# Patient Record
Sex: FEMALE | Race: WHITE | URBAN - METROPOLITAN AREA
[De-identification: names, ages, dates, MRNs, and addresses within clinical notes are randomized per-mention and may not be internally consistent; named-entity substitution may affect disease eponyms.]

---

## 2022-05-12 ENCOUNTER — OFFICE VISIT (OUTPATIENT)
Dept: NEUROLOGY | Age: 37
End: 2022-05-12
Payer: COMMERCIAL

## 2022-05-12 VITALS
OXYGEN SATURATION: 99 % | BODY MASS INDEX: 21.51 KG/M2 | DIASTOLIC BLOOD PRESSURE: 60 MMHG | HEART RATE: 70 BPM | HEIGHT: 64 IN | SYSTOLIC BLOOD PRESSURE: 110 MMHG | WEIGHT: 126 LBS

## 2022-05-12 DIAGNOSIS — G43.709 CHRONIC MIGRAINE WITHOUT AURA WITHOUT STATUS MIGRAINOSUS, NOT INTRACTABLE: Primary | ICD-10-CM

## 2022-05-12 DIAGNOSIS — G43.109 CHRONIC MIGRAINE WITH AURA: ICD-10-CM

## 2022-05-12 DIAGNOSIS — G43.109 COMPLICATED MIGRAINE: ICD-10-CM

## 2022-05-12 PROCEDURE — 99204 OFFICE O/P NEW MOD 45 MIN: CPT | Performed by: PSYCHIATRY & NEUROLOGY

## 2022-05-12 RX ORDER — CETIRIZINE HYDROCHLORIDE 10 MG/1
CAPSULE, LIQUID FILLED ORAL
COMMUNITY

## 2022-05-12 RX ORDER — MELATONIN
1000 DAILY
COMMUNITY

## 2022-05-12 RX ORDER — BUTALBITAL, ACETAMINOPHEN AND CAFFEINE 50; 325; 40 MG/1; MG/1; MG/1
TABLET ORAL
Qty: 24 TABLET | Refills: 0 | Status: SHIPPED | OUTPATIENT
Start: 2022-05-12

## 2022-05-12 RX ORDER — FROVATRIPTAN SUCCINATE 2.5 MG/1
TABLET, FILM COATED ORAL
Qty: 6 TABLET | Refills: 0 | Status: SHIPPED | OUTPATIENT
Start: 2022-05-12

## 2022-05-12 RX ORDER — FREMANEZUMAB-VFRM 225 MG/1.5ML
INJECTION SUBCUTANEOUS
COMMUNITY
Start: 2022-05-04 | End: 2022-05-12 | Stop reason: SDUPTHER

## 2022-05-12 RX ORDER — ONDANSETRON 4 MG/1
TABLET, ORALLY DISINTEGRATING ORAL
Qty: 20 TABLET | Refills: 0 | Status: SHIPPED | OUTPATIENT
Start: 2022-05-12

## 2022-05-12 RX ORDER — FREMANEZUMAB-VFRM 225 MG/1.5ML
225 INJECTION SUBCUTANEOUS
Qty: 1 EACH | Refills: 5 | Status: SHIPPED | OUTPATIENT
Start: 2022-05-12 | End: 2022-10-25

## 2022-05-12 NOTE — PROGRESS NOTES
Cruzito Sen (1985) is a 39 y.o. female, new patient, here for evaluation of the following     Chief complaint(s):   Chief Complaint   Patient presents with   [address] Patient     2-3 a month with ajovy lasting one day    Migraine       HPI: 39 y.o. female PMHx chronic migraine with and without visual aura, hx of PFO closure (\"noble stitch\" procedure), hx of complicated migraine (10 life-time episodes)    Referred to establish care regarding chronic migraine. 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 Previously followed by a Dr Lovell Congress at [facility]. Patient describes she started having migraines during college. When she gets a migraine it is a viselike feeling across her head, with light and sound sensitivity, with some degree of nausea and occasional vomiting. She reports her migraine generally last 2 days when she gets them. She reports her migraine frequency and responsiveness to acute migraine pain reliever was improved after addition of Ajovy (225 mg SQ once monthly injection). She estimates that now she has maybe 1 day a week of headache and that day is a migraine. For acute headache pain relief, she uses Tylenol 1000 mg and Ibuprofen 800 mg. She reports having episodic visual aura with her migraines, and separate from that, having had around life-time episodes of right arm numbness/ change in right vision with a migraine headache (ie complicated migraine). Says this was extensively evaluated with her previous Neurologist and Brain MRI imaging didn't reveal any concerning abnormalities (reviewed Brain MRI report 5-13-15 through [place]: Brain architecture is normal. 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 ..)    She reports trying and failing many different migraine preventive meds: topamax (paresthesia, weight loss, cognitive difficulty), inderal (x 10 yrs, caused low BP, and fatigue), amitriptyline (dry mouth). She reports trying/ failing many different acute migraine pain relievers: Ubrelvy (didn't help), Maxalt, Imitrex tablet, Imitrex Nasal Spray, Zomig Nasal Spray, Treximet (Naproxen-Sumatriptan), Relpax, Amerge     Does not recall trying: Nurtec, Reyvow, Frova, Fioricet          Review of Systems: completely negative.      ==================================================    Allergies   Allergen Reactions    Sulfa (Sulfonamide Antibiotics) Hives       Current Outpatient Medications   Medication Sig Dispense Refill    cholecalciferol (VITAMIN D3) (1000 Units /25 mcg) tablet Take 1,000 Units by mouth daily.  Cetirizine (ZyrTEC) 10 mg cap Zyrtec      frovatriptan (Frova) 2.5 mg tablet 1 tab at onset of migraine. May repeat one tablet in 2 hours if headache remains. Limit use to 2 days a week. 6 Tablet 0    butalbital-acetaminophen-caffeine (FIORICET, ESGIC) -40 mg per tablet Take 1 to 2 tabs at onset of migraine. May repeat one tab in 4 hours if headache remains. Limit use to 2 days a week. 24 Tablet 0    Ajovy Syringe 225 mg/1.5 mL syrg injection 1.5 mL by SubCUTAneous route every thirty (30) days. 1 Each 5    ondansetron (ZOFRAN ODT) 4 mg disintegrating tablet Take one tablet at onset of migraine-related nausea. May repeat one tablet in 4 hours if nausea remains. Caution: may cause drowsiness. Limit use to 2 days a week. 20 Tablet 0       No past medical history on file. No past surgical history on file. family history is not on file. PHYSICAL EXAM    Vitals:    05/12/22 1248   BP: 110/60   Pulse: 70   Height: 5' 4\" (1.626 m)   Weight: 57.2 kg (126 lb)   SpO2: 99%       General:    Head: atraumatic. Eyes: Conjunctivae clear. Vascular/ Carotid Arteries: not examined. Extremities: no edema. Skin: no rashes. Neurologic Exam:  Speech/ Language: no aphasia, no dysarthrai. Alertness: oriented x 3.  CNs: Smell: not tested.   Visual Fields (II): full to confrontation. Pupils (II): not examined. Funduscopic: not examined. Extraocular movements (III, IV, VI): conjugate in all directions, no LEVI. Ptosis (III, VII): none. Facial Sensation (V): intact to LT on both sides. Facial Movements (VII): symmetric at rest and on activation. Hearing (VIII): normal.  Soft palate elevation (IX): symmetric, no droop. Shoulder shrug (XI): not examined. Tongue protrusion (XII): midline    Motor:  5/5 in all exts. Sensory: intact LT in all exts. Cerebellar: no resting, postural or intention tremors on either side . Deep Tendon Reflexes: 1+ biceps, 2+ patellars. Plantar response: not tested. Gait: normal, including tandem. Romberg: negative      ==================================================    ASSESSMENT/ PLAN:       ICD-10-CM ICD-9-CM    1. Chronic migraine without aura without status migrainosus, not intractable  G43.709 346.70    2. Chronic migraine with aura  G43.109 346.00 frovatriptan (Frova) 2.5 mg tablet      butalbital-acetaminophen-caffeine (FIORICET, ESGIC) -40 mg per tablet      Ajovy Syringe 225 mg/1.5 mL syrg injection      ondansetron (ZOFRAN ODT) 4 mg disintegrating tablet   3. Complicated migraine  C71.978 346.00       Multiple migraine types:     Chronic migraine without aura  Chronic migraine with aura  Hx of Complicated migraine      Pt reports noticeable reduction in migraine frequency and increased responsiveness to acute headache pain relieves since being started on Ajovy by prior Neurologist.  Continue Ajovy 225 mg SQ once every 30 days as a migraine preventive. New Rx sent. For acute migraine pain reliever, pt has tried/ failed multiple meds as noted above and has been relying on Tylenol/ Ibuprofen to alleviate migraines. Discussed other meds: sent trial Rxs of Frovatriptan and Fioricet, pt can try on different migraine days and see if either works better than her Ibuprofen/ Tylenol.       Sent Rx for Zofran 4 mg ODT tablet to use of migraine related nausea    Follow up in 6 months      An electronic signature was used to authenticate this note.   -- Roddy Sanchez MD

## 2022-05-24 ENCOUNTER — TELEPHONE (OUTPATIENT)
Dept: NEUROLOGY | Age: 37
End: 2022-05-24

## 2022-05-24 DIAGNOSIS — G43.709 CHRONIC MIGRAINE WITHOUT AURA WITHOUT STATUS MIGRAINOSUS, NOT INTRACTABLE: Primary | ICD-10-CM

## 2022-06-20 ENCOUNTER — TELEPHONE (OUTPATIENT)
Dept: NEUROLOGY | Age: 37
End: 2022-06-20

## 2022-06-20 NOTE — TELEPHONE ENCOUNTER
Nurte submitted to [employer] via [facility] -     Case SD:[id number];KEVPQA:[id number]; Review Type:Prior Auth; Coverage Start Date:05/21/2022 - 06/20/2023    escribed to [facility] on [address]:   E-Prescribing Status: Receipt confirmed by pharmacy (5/25/2022  1:22 PM EDT)

## 2022-11-22 ENCOUNTER — OFFICE VISIT (OUTPATIENT)
Dept: NEUROLOGY | Age: 37
End: 2022-11-22
Payer: COMMERCIAL

## 2022-11-22 VITALS
DIASTOLIC BLOOD PRESSURE: 70 MMHG | BODY MASS INDEX: 22.02 KG/M2 | WEIGHT: 129 LBS | HEART RATE: 82 BPM | OXYGEN SATURATION: 98 % | HEIGHT: 64 IN | SYSTOLIC BLOOD PRESSURE: 108 MMHG

## 2022-11-22 DIAGNOSIS — G43.109 CHRONIC MIGRAINE WITH AURA: Primary | ICD-10-CM

## 2022-11-22 PROCEDURE — 99214 OFFICE O/P EST MOD 30 MIN: CPT

## 2022-11-22 RX ORDER — DICLOFENAC POTASSIUM 50 MG/1
POWDER, FOR SOLUTION ORAL
Qty: 9 PACKET | Refills: 2 | Status: SHIPPED | OUTPATIENT
Start: 2022-11-22

## 2022-11-22 NOTE — PROGRESS NOTES
Fatmata Sahni is a 40 y.o. female who presents with the following  Chief Complaint   Patient presents with    Follow-up     6 month    Migraine       HPI  Patient here for migraine follow-up. Last seen by Dr. Charmaine Wright 5/12/22. Patient was taking Ajovy 225 mg monthly injection that reduced her migraine to  1 a week. She was also using Tylenol 1000 mg and ibuprofen 800 mg for acute headaches. Her migraines started in college and they occured with a viselike feeling across her head + visual aura + photophobia and phonophobia, + n/v. She has a history of complicated migraines with right arm numbness/change in right vision. Patient states that she has not had any complicated migraines  since her PFO closure in 2018. She had an MRI of the brain with her previous neurologist that did not reveal any concerning abnormalities. She reports trying and failing many different migraine preventive meds: topamax (paresthesia, weight loss, cognitive difficulty), inderal (x 10 yrs, caused low BP, and fatigue), amitriptyline (dry mouth). She reports trying/ failing many different acute migraine pain relievers: Ubrelvy (didn't help), Maxalt, Imitrex tablet, Imitrex Nasal Spray, Zomig Nasal Spray, Treximet (Naproxen-Sumatriptan), Relpax, Amerge   Did not recall trying: Ryan Antis, Frova, Fioricet    Dr. Charmaine Wright ordered trial prescriptions of the Frovatriptan and Fioricet to try on different migraine days to see if either works better than her ibuprofen/Tylenol. He sent a prescription for Zofran 4 mg ODT tablet to use for migraine related nausea. Patient called the office May 24, 2022 asking for a prescription for Nurtec for migraine rescue. This was sent in her prior authorization was approved until June 20, 2023. Today the patient states that she is having 4 or 5 total migraine days a month. She is able to function with these migraines.   The migraines feel the same as if she has a vice  on her head from front to back. She experiences some nausea but has not had to take Zofran in quite a while. She is using the Ajovy 225 mg monthly which she likes and feels works well for her and Nurtec ODT 75 mg for rescue. She states that sometimes the Nurtec does not help to abort the headache and still takes Tylenol and ibuprofen if needed. She decided not to trial the frovatriptan or Fioricet that was prescribed to her at the last visit. She states that caffeine is one of her triggers therefore she does not want to try Fioricet. She states that triptans always made her have nausea vomiting and does not want frovatriptan. Patient states that she is not sure if she is ever tried Cambia powder for rescue before, but is willing to try it now. Current Outpatient Medications   Medication Sig    Diclofenac Potassium (Cambia) 50 mg pwpk Mix 1 packet with 1-2 ounces of water and drink immediately. One packet per migraine. Do not use more than 10 times per month. Do not use with other NSAIDS. Indications: a migraine headache    Ajovy Syringe 225 mg/1.5 mL syrg injection INJECT 225 MG (1.5ML) INTO THE SKIN EVERY 30 (THIRTY) DAYS    rimegepant (NURTEC) 75 mg disintegrating tablet Take one tablet at onset of migraine. Limit use to one tablet per 24 hours. Limit use to 2 days a week. cholecalciferol (VITAMIN D3) (1000 Units /25 mcg) tablet Take 1,000 Units by mouth daily. Cetirizine (ZyrTEC) 10 mg cap Zyrtec    ondansetron (ZOFRAN ODT) 4 mg disintegrating tablet Take one tablet at onset of migraine-related nausea. May repeat one tablet in 4 hours if nausea remains. Caution: may cause drowsiness. Limit use to 2 days a week. No current facility-administered medications for this visit. Social History     Tobacco Use   Smoking Status Never   Smokeless Tobacco Never       Past Medical History:   Diagnosis Date    Migraine        No past surgical history on file. History reviewed. No pertinent family history.     Social History     Socioeconomic History    Marital status: UNKNOWN   Tobacco Use    Smoking status: Never    Smokeless tobacco: Never   Vaping Use    Vaping Use: Never used   Substance and Sexual Activity    Drug use: Never       Review of Systems   Constitutional: Negative. Neurological: Negative. Remainder of comprehensive review is negative. Physical Exam :    Visit Vitals  /70 (BP 1 Location: Right upper arm, BP Patient Position: Sitting, BP Cuff Size: Adult)   Pulse 82   Ht 5' 4\" (1.626 m)   Wt 58.5 kg (129 lb)   SpO2 98%   BMI 22.14 kg/m²       General: Well defined, nourished, and groomed individual in no acute distress. Neck: Supple, nontender, no bruits, no pain with resistance to active range of motion. Musculoskeletal: Extremities revealed no edema and had full range of motion of joints. Psych: Good mood and bright affect    NEUROLOGICAL EXAMINATION:    Mental Status: Alert and oriented to person, place, and time    Cranial Nerves:    II, III, IV, VI: Visual acuity grossly intact. Visual fields are normal.    Pupils are equal, round, and reactive to light and accommodation. Extra-ocular movements are full and fluid. Fundoscopic exam was benign, no ptosis or nystagmus. V-XII: Hearing is grossly intact. Facial features are symmetric, with normal sensation and strength. The palate rises symmetrically and the tongue protrudes midline. Sternocleidomastoids 5/5. Motor Examination: Normal tone, bulk, and strength, 5/5 muscle strength throughout. Coordination: Finger to nose was normal. No resting or intention tremor    Gait and Station: Steady while walking. Normal arm swing. No pronator drift. No muscle wasting or fasiculations noted. Reflexes: DTRs 2+ throughout. No results found for this or any previous visit. Orders Placed This Encounter    Diclofenac Potassium (Cambia) 50 mg pwpk     Sig: Mix 1 packet with 1-2 ounces of water and drink immediately.  One packet per migraine. Do not use more than 10 times per month. Do not use with other NSAIDS. Indications: a migraine headache     Dispense:  9 Packet     Refill:  2       1. Chronic migraine with aura      Follow-up and Dispositions    Return in about 9 weeks (around 1/24/2023) for Medication f/u started Cambia . Continue using Ajovy 225 mg for migraine prevention monthly  Continue using Nurtec ODT 75 mg for migraine rescue as needed once a day  Sent prescription for Cambia 50 mg powder to be taken for migraine rescue if Nurtec does not stop the migraine first.  Advised patient not to take with other NSAIDs or  more than 10 days a month. Return to the office in 8 to 9 weeks for medication evaluation or sooner if needed.       This note will not be viewable in [employer]

## 2022-11-22 NOTE — PROGRESS NOTES
Identified pt with two pt identifiers. Reviewed record in preparation for visit and have obtained necessary documentation. All patient medications has been reviewed. Chief Complaint   Patient presents with    Follow-up     6 month    Migraine     Additional information about chief complaint:    Visit Vitals  /70 (BP 1 Location: Right upper arm, BP Patient Position: Sitting, BP Cuff Size: Adult)   Pulse 82   Ht 5' 4\" (1.626 m)   Wt 129 lb (58.5 kg)   SpO2 98%   BMI 22.14 kg/m²       Health Maintenance Due   Topic    Hepatitis C Screening     Cervical cancer screen        1. Have you been to the ER, urgent care clinic since your last visit? Hospitalized since your last visit? no    2. Have you seen or consulted any other health care providers outside of the [address] [place] [address] since your last visit? Include any pap smears or colon screening.  no

## 2022-12-19 ENCOUNTER — APPOINTMENT (RX ONLY)
Dept: URBAN - METROPOLITAN AREA CLINIC 41 | Facility: CLINIC | Age: 37
Setting detail: DERMATOLOGY
End: 2022-12-19

## 2022-12-19 DIAGNOSIS — L70.0 ACNE VULGARIS: ICD-10-CM | Status: INADEQUATELY CONTROLLED

## 2022-12-19 PROCEDURE — 99204 OFFICE O/P NEW MOD 45 MIN: CPT

## 2022-12-19 PROCEDURE — ? COUNSELING

## 2022-12-19 PROCEDURE — ? ORDER TESTS

## 2022-12-19 PROCEDURE — ? PRESCRIPTION

## 2022-12-19 PROCEDURE — ? PRESCRIPTION MEDICATION MANAGEMENT

## 2022-12-19 PROCEDURE — ? ADDITIONAL NOTES

## 2022-12-19 RX ORDER — CLASCOTERONE 1 G/100G
CREAM TOPICAL
Qty: 60 | Refills: 4 | Status: ERX | COMMUNITY
Start: 2022-12-19

## 2022-12-19 RX ADMIN — CLASCOTERONE: 1 CREAM TOPICAL at 00:00

## 2022-12-19 NOTE — PROCEDURE: PRESCRIPTION MEDICATION MANAGEMENT
Render In Strict Bullet Format?: No
Detail Level: Zone
Initiate Treatment: [place] 50mg bid x 6 months pending kaleigh MCKEE bid

## 2022-12-19 NOTE — PROCEDURE: COUNSELING
Isotretinoin Counseling: Patient should get monthly blood tests, not donate blood, not drive at night if vision affected, not share medication, and not undergo elective surgery for 6 months after tx completed. Side effects reviewed, pt to contact office should one occur.
Topical Sulfur Applications Pregnancy And Lactation Text: This medication is Pregnancy Category C and has an unknown safety profile during pregnancy. It is unknown if this topical medication is excreted in breast milk.
Birth Control Pills Pregnancy And Lactation Text: This medication should be avoided if pregnant and for the first 30 days post-partum.
Sarecycline Pregnancy And Lactation Text: This medication is Pregnancy Category D and not consider safe during pregnancy. It is also excreted in breast milk.
Azelaic Acid Counseling: Patient counseled that medicine may cause skin irritation and to avoid applying near the eyes.  In the event of skin irritation, the patient was advised to reduce the amount of the drug applied or use it less frequently.   The patient verbalized understanding of the proper use and possible adverse effects of azelaic acid.  All of the patient's questions and concerns were addressed.
Detail Level: Zone
Bactrim Pregnancy And Lactation Text: This medication is Pregnancy Category D and is known to cause fetal risk.  It is also excreted in breast milk.
Tazorac Pregnancy And Lactation Text: This medication is not safe during pregnancy. It is unknown if this medication is excreted in breast milk.
Patient Specific Counseling (Will Not Stick From Patient To Patient): NG counsels on Accutane as possible option since pt has tried and failed so many medications. Pt will retry jefe 50mg bid and is sending over recent BW results with cmp from pcp. Pt to consider accutane if fails jefe.
Include Pregnancy/Lactation Warning?: No
Erythromycin Counseling:  I discussed with the patient the risks of erythromycin including but not limited to GI upset, allergic reaction, drug rash, diarrhea, increase in liver enzymes, and yeast infections.
Aklief counseling:  Patient advised to apply a pea-sized amount only at bedtime and wait 30 minutes after washing their face before applying.  If too drying, patient may add a non-comedogenic moisturizer.  The most commonly reported side effects including irritation, redness, scaling, dryness, stinging, burning, itching, and increased risk of sunburn.  The patient verbalized understanding of the proper use and possible adverse effects of retinoids.  All of the patient's questions and concerns were addressed.
Topical Retinoid Pregnancy And Lactation Text: This medication is Pregnancy Category C. It is unknown if this medication is excreted in breast milk.
Winlevi Pregnancy And Lactation Text: This medication is considered safe during pregnancy and breastfeeding.
High Dose Vitamin A Pregnancy And Lactation Text: High dose vitamin A therapy is contraindicated during pregnancy and breast feeding.
Azithromycin Pregnancy And Lactation Text: This medication is considered safe during pregnancy and is also secreted in breast milk.
Doxycycline Counseling:  Patient counseled regarding possible photosensitivity and increased risk for sunburn.  Patient instructed to avoid sunlight, if possible.  When exposed to sunlight, patients should wear protective clothing, sunglasses, and sunscreen.  The patient was instructed to call the office immediately if the following severe adverse effects occur:  hearing changes, easy bruising/bleeding, severe headache, or vision changes.  The patient verbalized understanding of the proper use and possible adverse effects of doxycycline.  All of the patient's questions and concerns were addressed.
Benzoyl Peroxide Pregnancy And Lactation Text: This medication is Pregnancy Category C. It is unknown if benzoyl peroxide is excreted in breast milk.
Tetracycline Counseling: Patient counseled regarding possible photosensitivity and increased risk for sunburn.  Patient instructed to avoid sunlight, if possible.  When exposed to sunlight, patients should wear protective clothing, sunglasses, and sunscreen.  The patient was instructed to call the office immediately if the following severe adverse effects occur:  hearing changes, easy bruising/bleeding, severe headache, or vision changes.  The patient verbalized understanding of the proper use and possible adverse effects of tetracycline.  All of the patient's questions and concerns were addressed. Patient understands to avoid pregnancy while on therapy due to potential birth defects.
Spironolactone Counseling: Patient advised regarding risks of diarrhea, abdominal pain, hyperkalemia, birth defects (for female patients), liver toxicity and renal toxicity. The patient may need blood work to monitor liver and kidney function and potassium levels while on therapy. The patient verbalized understanding of the proper use and possible adverse effects of spironolactone.  All of the patient's questions and concerns were addressed.
Dapsone Counseling: I discussed with the patient the risks of dapsone including but not limited to hemolytic anemia, agranulocytosis, rashes, methemoglobinemia, kidney failure, peripheral neuropathy, headaches, GI upset, and liver toxicity.  Patients who start dapsone require monitoring including baseline LFTs and weekly CBCs for the first month, then every month thereafter.  The patient verbalized understanding of the proper use and possible adverse effects of dapsone.  All of the patient's questions and concerns were addressed.
Isotretinoin Pregnancy And Lactation Text: This medication is Pregnancy Category X and is considered extremely dangerous during pregnancy. It is unknown if it is excreted in breast milk.
Topical Sulfur Applications Counseling: Topical Sulfur Counseling: Patient counseled that this medication may cause skin irritation or allergic reactions.  In the event of skin irritation, the patient was advised to reduce the amount of the drug applied or use it less frequently.   The patient verbalized understanding of the proper use and possible adverse effects of topical sulfur application.  All of the patient's questions and concerns were addressed.
Azelaic Acid Pregnancy And Lactation Text: This medication is considered safe during pregnancy and breast feeding.
Birth Control Pills Counseling: Birth Control Pill Counseling: I discussed with the patient the potential side effects of OCPs including but not limited to increased risk of stroke, heart attack, thrombophlebitis, deep venous thrombosis, hepatic adenomas, breast changes, GI upset, headaches, and depression.  The patient verbalized understanding of the proper use and possible adverse effects of OCPs. All of the patient's questions and concerns were addressed.
Topical Clindamycin Counseling: Patient counseled that this medication may cause skin irritation or allergic reactions.  In the event of skin irritation, the patient was advised to reduce the amount of the drug applied or use it less frequently.   The patient verbalized understanding of the proper use and possible adverse effects of clindamycin.  All of the patient's questions and concerns were addressed.
Sarecycline Counseling: Patient advised regarding possible photosensitivity and discoloration of the teeth, skin, lips, tongue and gums.  Patient instructed to avoid sunlight, if possible.  When exposed to sunlight, patients should wear protective clothing, sunglasses, and sunscreen.  The patient was instructed to call the office immediately if the following severe adverse effects occur:  hearing changes, easy bruising/bleeding, severe headache, or vision changes.  The patient verbalized understanding of the proper use and possible adverse effects of sarecycline.  All of the patient's questions and concerns were addressed.
Aklief Pregnancy And Lactation Text: It is unknown if this medication is safe to use during pregnancy.  It is unknown if this medication is excreted in breast milk.  Breastfeeding women should use the topical cream on the smallest area of the skin for the shortest time needed while breastfeeding.  Do not apply to nipple and areola.
Erythromycin Pregnancy And Lactation Text: This medication is Pregnancy Category B and is considered safe during pregnancy. It is also excreted in breast milk.
Winlevi Counseling:  I discussed with the patient the risks of topical clascoterone including but not limited to erythema, scaling, itching, and stinging. Patient voiced their understanding.
Doxycycline Pregnancy And Lactation Text: This medication is Pregnancy Category D and not consider safe during pregnancy. It is also excreted in breast milk but is considered safe for shorter treatment courses.
Tazorac Counseling:  Patient advised that medication is irritating and drying.  Patient may need to apply sparingly and wash off after an hour before eventually leaving it on overnight.  The patient verbalized understanding of the proper use and possible adverse effects of tazorac.  All of the patient's questions and concerns were addressed.
Bactrim Counseling:  I discussed with the patient the risks of sulfa antibiotics including but not limited to GI upset, allergic reaction, drug rash, diarrhea, dizziness, photosensitivity, and yeast infections.  Rarely, more serious reactions can occur including but not limited to aplastic anemia, agranulocytosis, methemoglobinemia, blood dyscrasias, liver or kidney failure, lung infiltrates or desquamative/blistering drug rashes.
Minocycline Counseling: Patient advised regarding possible photosensitivity and discoloration of the teeth, skin, lips, tongue and gums.  Patient instructed to avoid sunlight, if possible.  When exposed to sunlight, patients should wear protective clothing, sunglasses, and sunscreen.  The patient was instructed to call the office immediately if the following severe adverse effects occur:  hearing changes, easy bruising/bleeding, severe headache, or vision changes.  The patient verbalized understanding of the proper use and possible adverse effects of minocycline.  All of the patient's questions and concerns were addressed.
Topical Retinoid counseling:  Patient advised to apply a pea-sized amount only at bedtime and wait 30 minutes after washing their face before applying.  If too drying, patient may add a non-comedogenic moisturizer. The patient verbalized understanding of the proper use and possible adverse effects of retinoids.  All of the patient's questions and concerns were addressed.
High Dose Vitamin A Counseling: Side effects reviewed, pt to contact office should one occur.
Dapsone Pregnancy And Lactation Text: This medication is Pregnancy Category C and is not considered safe during pregnancy or breast feeding.
Azithromycin Counseling:  I discussed with the patient the risks of azithromycin including but not limited to GI upset, allergic reaction, drug rash, diarrhea, and yeast infections.
Topical Clindamycin Pregnancy And Lactation Text: This medication is Pregnancy Category B and is considered safe during pregnancy. It is unknown if it is excreted in breast milk.
Benzoyl Peroxide Counseling: Patient counseled that medicine may cause skin irritation and bleach clothing.  In the event of skin irritation, the patient was advised to reduce the amount of the drug applied or use it less frequently.   The patient verbalized understanding of the proper use and possible adverse effects of benzoyl peroxide.  All of the patient's questions and concerns were addressed.
Spironolactone Pregnancy And Lactation Text: This medication can cause feminization of the male fetus and should be avoided during pregnancy. The active metabolite is also found in breast milk.

## 2022-12-19 NOTE — HPI: PIMPLES (ACNE)
Is This A New Presentation, Or A Follow-Up?: Acne
Additional Comments (Use Complete Sentences): This is an established patient here for acne. She has treated before but topicals were way too drying. She is using over the counter Adapalene but not having much success.\\n\\nShe uses otc hydrocholloid acne patches which help treat but not prevent the big cystic pimples around chin and neck area.\\n\\nShe has tried and failed aczone, doxycycline, amzeeq, veltin, epiduo forte, soolantra, spironolactone, finacea.

## 2022-12-19 NOTE — PROCEDURE: ORDER TESTS
Expected Date Of Service: 12/19/2022
Performing Laboratory: 0
Billing Type: Third-Party Bill
Bill For Surgical Tray: no

## 2022-12-23 ENCOUNTER — RX ONLY (OUTPATIENT)
Age: 37
Setting detail: RX ONLY
End: 2022-12-23

## 2022-12-23 RX ORDER — CLASCOTERONE 1 G/100G
CREAM TOPICAL
Qty: 60 | Refills: 4 | Status: ERX

## 2022-12-23 RX ORDER — SPIRONOLACTONE 50 MG/1
TABLET, FILM COATED ORAL
Qty: 120 | Refills: 2 | Status: ERX | COMMUNITY
Start: 2022-12-23

## 2022-12-27 DIAGNOSIS — G43.109 CHRONIC MIGRAINE WITH AURA: Primary | ICD-10-CM

## 2022-12-28 NOTE — TELEPHONE ENCOUNTER
Patient requesting refill on:    Requested Prescriptions     Pending Prescriptions Disp Refills    Ajovy Syringe 225 mg/1.5 mL syrg injection [Pharmacy Med Name: AJOVY 225 MG/1.5 ML SYRINGE]  1     Sig: INJECT 225 MG (1.5ML) INTO THE SKIN EVERY 30 (THIRTY) DAYS

## 2022-12-29 ENCOUNTER — TELEPHONE (OUTPATIENT)
Dept: NEUROLOGY | Age: 37
End: 2022-12-29

## 2022-12-29 DIAGNOSIS — G43.109 CHRONIC MIGRAINE WITH AURA: ICD-10-CM

## 2022-12-29 RX ORDER — FREMANEZUMAB-VFRM 225 MG/1.5ML
INJECTION SUBCUTANEOUS
Qty: 1 EACH | Refills: 1 | Status: SHIPPED | OUTPATIENT
Start: 2022-12-29

## 2022-12-29 NOTE — TELEPHONE ENCOUNTER
Requested Prescriptions     Pending Prescriptions Disp Refills    fremanezumab-vfrm (Ajovy Syringe) 225 mg/1.5 mL syrg injection 1 Each 1     Pt req refill

## 2023-01-03 RX ORDER — FREMANEZUMAB-VFRM 225 MG/1.5ML
225 INJECTION SUBCUTANEOUS
Qty: 1.5 ML | Refills: 2 | Status: SHIPPED | OUTPATIENT
Start: 2023-01-03

## 2023-01-03 RX ORDER — FREMANEZUMAB-VFRM 225 MG/1.5ML
INJECTION SUBCUTANEOUS
Refills: 1 | OUTPATIENT
Start: 2023-01-03

## 2023-01-17 ENCOUNTER — OFFICE VISIT (OUTPATIENT)
Dept: NEUROLOGY | Age: 38
End: 2023-01-17
Payer: COMMERCIAL

## 2023-01-17 VITALS
SYSTOLIC BLOOD PRESSURE: 102 MMHG | RESPIRATION RATE: 20 BRPM | DIASTOLIC BLOOD PRESSURE: 68 MMHG | OXYGEN SATURATION: 98 % | HEART RATE: 79 BPM

## 2023-01-17 DIAGNOSIS — G43.709 CHRONIC MIGRAINE WITHOUT AURA WITHOUT STATUS MIGRAINOSUS, NOT INTRACTABLE: Primary | ICD-10-CM

## 2023-01-17 PROCEDURE — 99213 OFFICE O/P EST LOW 20 MIN: CPT

## 2023-01-17 NOTE — PROGRESS NOTES
Headaches-  cambia was approved but pharmacy never notified her it was approved     Same as when she was here last visit   Usually around her cycle- this past month was about 6 headache days which is average   Some months it might be 8 headaches days a month

## 2023-01-17 NOTE — PROGRESS NOTES
Hunter Maurer is a 40 y.o. female who presents with the following  Chief Complaint   Patient presents with    Follow-up     Tried a new medication Cambia- headaches        HPI  Patient here for migraine follow-up. Last seen by Dr. Fuad Roy 5/12/22. Patient was taking Ajovy 225 mg monthly injection that reduced her migraine to  1 a week. She was also using Tylenol 1000 mg and ibuprofen 800 mg for acute headaches. Her migraines started in college and they occured with a viselike feeling across her head + visual aura + photophobia and phonophobia, + n/v. She has a history of complicated migraines with right arm numbness/change in right vision. Patient states that she has not had any complicated migraines  since her PFO closure in 2018. She had an MRI of the brain with her previous neurologist that did not reveal any concerning abnormalities. She reports trying and failing many different migraine preventive meds: topamax (paresthesia, weight loss, cognitive difficulty), inderal (x 10 yrs, caused low BP, and fatigue), amitriptyline (dry mouth). She reports trying/ failing many different acute migraine pain relievers: Ubrelvy (didn't help), Maxalt, Imitrex tablet, Imitrex Nasal Spray, Zomig Nasal Spray, Treximet (Naproxen-Sumatriptan), Relpax, Amerge   Did not recall trying: Tildon Vernon, Frova, Fioricet     Dr. Fuad Roy ordered trial prescriptions of the Frovatriptan and Fioricet to try on different migraine days to see if either works better than her ibuprofen/Tylenol. He sent a prescription for Zofran 4 mg ODT tablet to use for migraine related nausea. Patient called the office May 24, 2022 asking for a prescription for Nurtec for migraine rescue. This was sent in her prior authorization was approved until June 20, 2023. Today the patient states that she is having 4 or 5 total migraine days a month. She is able to function with these migraines.   The migraines feel the same as if she has a vice  on her head from front to back. She experiences some nausea but has not had to take Zofran in quite a while. She is using the Ajovy 225 mg monthly which she likes and feels works well for her and Nurtec ODT 75 mg for rescue. She states that sometimes the Nurtec does not help to abort the headache and still takes Tylenol and ibuprofen if needed. She decided not to trial the frovatriptan or Fioricet that was prescribed to her at the last visit. She states that caffeine is one of her triggers therefore she does not want to try Fioricet. She states that triptans always made her have nausea vomiting and does not want frovatriptan. Patient states that she is not sure if she is ever tried Cambia powder for rescue before, but is willing to try it now. Continue using Ajovy 225 mg for migraine prevention monthly  Continue using Nurtec ODT 75 mg for migraine rescue as needed once a day  Sent prescription for Cambia 50 mg powder to be taken for migraine rescue if Nurtec does not stop the migraine first.  Advised patient not to take with other NSAIDs or  more than 10 days a month. Return to the office in 8 to 9 weeks for medication evaluation or sooner if needed. Today's note:  Patient is just learning that her Georgiana Martines prior authorization was approved until January 2, 2023 so she will call her pharmacy and have this filled today. She states that the month of December was not bad as far as headaches were concerned (had maybe 4) she did take 1 Nurtec and some Tylenol and Advil which helped relieve her headaches. Still doing well with the Ajovy. States that since being on Ajovy her migraines are more manageable and less severe. Allergies   Allergen Reactions    Sulfa (Sulfonamide Antibiotics) Hives       Current Outpatient Medications   Medication Sig    fremanezumab-vfrm (Ajovy Autoinjector) 225 mg/1.5 mL auto-injector 1.5 mL by SubCUTAneous route every month.     rimegepant (NURTEC) 75 mg disintegrating tablet Take one tablet at onset of migraine. Limit use to one tablet per 24 hours. Limit use to 2 days a week. cholecalciferol (VITAMIN D3) (1000 Units /25 mcg) tablet Take 1,000 Units by mouth daily. Cetirizine (ZyrTEC) 10 mg cap Zyrtec    ondansetron (ZOFRAN ODT) 4 mg disintegrating tablet Take one tablet at onset of migraine-related nausea. May repeat one tablet in 4 hours if nausea remains. Caution: may cause drowsiness. Limit use to 2 days a week. Diclofenac Potassium (Cambia) 50 mg pwpk Mix 1 packet with 1-2 ounces of water and drink immediately. One packet per migraine. Do not use more than 10 times per month. Do not use with other NSAIDS. Indications: a migraine headache (Patient not taking: Reported on 1/17/2023)     No current facility-administered medications for this visit. Social History     Tobacco Use   Smoking Status Never   Smokeless Tobacco Never       Past Medical History:   Diagnosis Date    Migraine        No past surgical history on file. No family history on file. Social History     Socioeconomic History    Marital status: UNKNOWN   Tobacco Use    Smoking status: Never    Smokeless tobacco: Never   Vaping Use    Vaping Use: Never used   Substance and Sexual Activity    Drug use: Never       Review of Systems   Neurological:  Positive for headaches. Remainder of comprehensive review is negative. Physical Exam :    Visit Vitals  /68 (BP 1 Location: Left upper arm, BP Patient Position: Sitting, BP Cuff Size: Adult)   Pulse 79   Resp 20   SpO2 98%       General: Well defined, nourished, and groomed individual in no acute distress. Neck: Supple, nontender, no bruits, no pain with resistance to active range of motion. Musculoskeletal: Extremities revealed no edema and had full range of motion of joints.     Psych: Good mood and bright affect    NEUROLOGICAL EXAMINATION:    Mental Status: Alert and oriented to person, place, and time    Cranial Nerves: II, III, IV, VI: Visual acuity grossly intact. Visual fields are normal.    Pupils are equal, round, and reactive to light and accommodation. Extra-ocular movements are full and fluid. Fundoscopic exam was benign, no ptosis or nystagmus. V-XII: Hearing is grossly intact. Facial features are symmetric, with normal sensation and strength. The palate rises symmetrically and the tongue protrudes midline. Sternocleidomastoids 5/5. Motor Examination: Normal tone, bulk, and strength, 5/5 muscle strength throughout. Coordination: Finger to nose was normal. No resting or intention tremor    Gait and Station: Steady while walking. Normal arm swing. No pronator drift. No muscle wasting or fasiculations noted. Reflexes: DTRs 2+ throughout. No results found for this or any previous visit. No orders of the defined types were placed in this encounter. 1. Chronic migraine without aura without status migrainosus, not intractable      Continue Ajovy 225 mg injection monthly for migraine prevention  Continue using Nurtec ODT 75 mg as needed for migraine rescue once daily  Will  prescription for Cambia 50 mg powder to be taken for migraine rescue if Nurtec does not stop the migraine first.  Advised patient not to take with other NSAIDs or  more than 10 days a month. Patient will send me a [employer] message to let me know how she is doing with the [facility]. She would like a 6-month follow-up.           This note will not be viewable in [employer]

## 2023-01-18 ENCOUNTER — RX ONLY (OUTPATIENT)
Age: 38
Setting detail: RX ONLY
End: 2023-01-18

## 2023-01-18 RX ORDER — CLASCOTERONE 1 G/100G
CREAM TOPICAL
Qty: 60 | Refills: 4 | Status: ERX

## 2023-02-27 DIAGNOSIS — G43.109 CHRONIC MIGRAINE WITH AURA: ICD-10-CM

## 2023-02-27 RX ORDER — FREMANEZUMAB-VFRM 225 MG/1.5ML
225 INJECTION SUBCUTANEOUS
Qty: 1.5 ML | Refills: 4 | Status: SHIPPED | OUTPATIENT
Start: 2023-02-27

## 2023-05-23 DIAGNOSIS — G43.709 CHRONIC MIGRAINE WITHOUT AURA, NOT INTRACTABLE, WITHOUT STATUS MIGRAINOSUS: ICD-10-CM

## 2023-05-25 DIAGNOSIS — G43.709 CHRONIC MIGRAINE WITHOUT AURA, NOT INTRACTABLE, WITHOUT STATUS MIGRAINOSUS: Primary | ICD-10-CM

## 2023-05-25 RX ORDER — RIMEGEPANT SULFATE 75 MG/75MG
TABLET, ORALLY DISINTEGRATING ORAL
Qty: 10 TABLET | Refills: 2 | OUTPATIENT
Start: 2023-05-25

## 2023-06-09 DIAGNOSIS — G43.709 CHRONIC MIGRAINE WITHOUT AURA, NOT INTRACTABLE, WITHOUT STATUS MIGRAINOSUS: ICD-10-CM

## 2023-06-09 DIAGNOSIS — G43.709 CHRONIC MIGRAINE WITHOUT AURA, NOT INTRACTABLE, WITHOUT STATUS MIGRAINOSUS: Primary | ICD-10-CM

## 2023-06-09 RX ORDER — FREMANEZUMAB-VFRM 225 MG/1.5ML
225 INJECTION SUBCUTANEOUS
Qty: 1.68 ML | Refills: 5 | COMMUNITY
End: 2023-06-09 | Stop reason: SDUPTHER

## 2023-06-09 RX ORDER — FREMANEZUMAB-VFRM 225 MG/1.5ML
225 INJECTION SUBCUTANEOUS
Qty: 1.68 ML | Refills: 5 | Status: SHIPPED | OUTPATIENT
Start: 2023-06-09

## 2023-06-12 ENCOUNTER — TELEPHONE (OUTPATIENT)
Age: 38
End: 2023-06-12

## 2023-06-12 RX ORDER — FREMANEZUMAB-VFRM 225 MG/1.5ML
INJECTION SUBCUTANEOUS
Qty: 1.5 ML | Refills: 2 | OUTPATIENT
Start: 2023-06-12

## 2023-06-20 ENCOUNTER — TELEPHONE (OUTPATIENT)
Age: 38
End: 2023-06-20

## 2023-06-20 NOTE — TELEPHONE ENCOUNTER
Patient is calling to follow up on the PA for the medication (Ajovy Syringe). Stated she still has Nurtec for her headaches. Stated that it took a combination of responses within a month. Fax can be sent to [address] [place] [address] [address]     Fax: [phone]  Office: [phone] Prior Authorization Department    Patient would like a call back for the status. Please contact.

## 2023-06-20 NOTE — TELEPHONE ENCOUNTER
Nurtec: Key: [id number]    \"An active PA is already on file with expiration date of 05/24/2024\"- [facility]    Ajovy: Key: [id number]  [facility]:[id number]  05/21/2023 - 06/19/2024  Called into [facility]    FYI to Joan Alanis

## 2023-07-18 ENCOUNTER — OFFICE VISIT (OUTPATIENT)
Age: 38
End: 2023-07-18
Payer: COMMERCIAL

## 2023-07-18 VITALS
SYSTOLIC BLOOD PRESSURE: 108 MMHG | RESPIRATION RATE: 20 BRPM | TEMPERATURE: 97.3 F | DIASTOLIC BLOOD PRESSURE: 78 MMHG | HEART RATE: 75 BPM | OXYGEN SATURATION: 99 %

## 2023-07-18 DIAGNOSIS — G43.709 CHRONIC MIGRAINE WITHOUT AURA, NOT INTRACTABLE, WITHOUT STATUS MIGRAINOSUS: Primary | ICD-10-CM

## 2023-07-18 PROCEDURE — 99213 OFFICE O/P EST LOW 20 MIN: CPT

## 2023-07-18 RX ORDER — ONDANSETRON 4 MG/1
TABLET, ORALLY DISINTEGRATING ORAL
Qty: 30 TABLET | Refills: 5 | Status: SHIPPED | OUTPATIENT
Start: 2023-07-18

## 2023-12-15 DIAGNOSIS — G43.709 CHRONIC MIGRAINE WITHOUT AURA, NOT INTRACTABLE, WITHOUT STATUS MIGRAINOSUS: ICD-10-CM

## 2023-12-15 RX ORDER — FREMANEZUMAB-VFRM 225 MG/1.5ML
225 INJECTION SUBCUTANEOUS
Qty: 1.5 ML | Refills: 5 | Status: SHIPPED | OUTPATIENT
Start: 2023-12-15

## 2024-04-26 ENCOUNTER — TELEPHONE (OUTPATIENT)
Age: 39
End: 2024-04-26

## 2024-04-26 NOTE — TELEPHONE ENCOUNTER
RE:Nurtec 75 mg tablet prior authorization request sent to [employer] through [facility]    Key: [id number]    PA Case ID: [id number]    Status:Approved     Coverage Start Date:03/27/2024    Coverage End Date:04/26/2025    Approval details in the referral tab    FYI to nurse

## 2024-04-29 ENCOUNTER — TELEPHONE (OUTPATIENT)
Age: 39
End: 2024-04-29

## 2024-04-29 DIAGNOSIS — G43.709 CHRONIC MIGRAINE WITHOUT AURA, NOT INTRACTABLE, WITHOUT STATUS MIGRAINOSUS: Primary | ICD-10-CM

## 2024-04-29 RX ORDER — DICLOFENAC POTASSIUM 50 MG/1
POWDER, FOR SOLUTION ORAL
Qty: 1 EACH | Refills: 5 | Status: SHIPPED | OUTPATIENT
Start: 2024-04-29

## 2024-04-29 NOTE — TELEPHONE ENCOUNTER
Patient is requesting a refill on her medication Diclofenac powder pack.  Pharmacy informed her they have been reaching out with no response.    Rayne stated she is completely out.    Next appointment 07/16/24    Requesting to be contacted

## 2024-05-02 ENCOUNTER — TELEPHONE (OUTPATIENT)
Age: 39
End: 2024-05-02

## 2024-05-02 NOTE — TELEPHONE ENCOUNTER
RE:Cambia 50 mg packets prior authorization request sent to [employer] through [facility]    Key: [id number]    PA Case ID: [id number]    Status:Approved;     Coverage Start Date:04/02/2024     Coverage End Date:05/02/2025;     FYI to nurse

## 2024-06-03 ENCOUNTER — PATIENT MESSAGE (OUTPATIENT)
Age: 39
End: 2024-06-03

## 2024-06-04 ENCOUNTER — TELEPHONE (OUTPATIENT)
Age: 39
End: 2024-06-04

## 2024-06-04 DIAGNOSIS — G43.709 CHRONIC MIGRAINE WITHOUT AURA, NOT INTRACTABLE, WITHOUT STATUS MIGRAINOSUS: Primary | ICD-10-CM

## 2024-06-04 DIAGNOSIS — G43.709 CHRONIC MIGRAINE WITHOUT AURA, NOT INTRACTABLE, WITHOUT STATUS MIGRAINOSUS: ICD-10-CM

## 2024-06-04 RX ORDER — FREMANEZUMAB-VFRM 225 MG/1.5ML
225 INJECTION SUBCUTANEOUS
Qty: 1.5 ML | Refills: 1 | Status: SHIPPED | OUTPATIENT
Start: 2024-06-04 | End: 2024-06-04

## 2024-06-04 RX ORDER — FREMANEZUMAB-VFRM 225 MG/1.5ML
225 INJECTION SUBCUTANEOUS
Qty: 1.5 ML | Refills: 1 | Status: SHIPPED | OUTPATIENT
Start: 2024-06-04

## 2024-06-04 NOTE — TELEPHONE ENCOUNTER
----- Message from Rayne Velasco sent at 6/4/2024  2:29 PM EDT -----  Regarding: Yandel prior auth  Contact: [phone]  Hi!  I actually need Yandel’s Prior Authorization renewed at [employer]. Their number is [phone] and they can approve it over the phone if you call that number. There is also a fax number: [phone].  I just called to verify that they have not received a renewal for the Prior Auth, and it expires in 2 wks.  Thank you for your help,  Rayne

## 2024-06-06 ENCOUNTER — TELEPHONE (OUTPATIENT)
Age: 39
End: 2024-06-06

## 2024-06-06 NOTE — TELEPHONE ENCOUNTER
RE:Ajovy 225 mg auto injector prior authorization request sent to [employer] through [facility]    Key: [id number]     PA Case ID: [id number]    Coverage Start Date:05/07/2024    Coverage End Date:06/06/2025    FYI to nurse

## 2024-07-16 ENCOUNTER — OFFICE VISIT (OUTPATIENT)
Age: 39
End: 2024-07-16
Payer: COMMERCIAL

## 2024-07-16 ENCOUNTER — TELEPHONE (OUTPATIENT)
Age: 39
End: 2024-07-16

## 2024-07-16 VITALS
DIASTOLIC BLOOD PRESSURE: 68 MMHG | OXYGEN SATURATION: 96 % | RESPIRATION RATE: 20 BRPM | HEART RATE: 88 BPM | SYSTOLIC BLOOD PRESSURE: 110 MMHG

## 2024-07-16 DIAGNOSIS — G43.709 CHRONIC MIGRAINE WITHOUT AURA, NOT INTRACTABLE, WITHOUT STATUS MIGRAINOSUS: ICD-10-CM

## 2024-07-16 PROCEDURE — 99213 OFFICE O/P EST LOW 20 MIN: CPT

## 2024-07-16 RX ORDER — FREMANEZUMAB-VFRM 225 MG/1.5ML
225 INJECTION SUBCUTANEOUS
Qty: 1.5 ML | Refills: 11 | Status: SHIPPED | OUTPATIENT
Start: 2024-07-16

## 2024-07-16 RX ORDER — ONDANSETRON 4 MG/1
TABLET, ORALLY DISINTEGRATING ORAL
Qty: 30 TABLET | Refills: 5 | Status: SHIPPED | OUTPATIENT
Start: 2024-07-16

## 2024-07-16 ASSESSMENT — ENCOUNTER SYMPTOMS
NAUSEA: 1
PHOTOPHOBIA: 1

## 2024-07-16 NOTE — PROGRESS NOTES
Headaches- have been alright   Not that much different   Sometimes within a week she may take the nurtec or the cambia  Sometimes the nurtec does not always work the cambia does seem to work better     
110/68 (Site: Left Upper Arm, Position: Sitting, Cuff Size: Large Adult)   Pulse 88   Resp 20   SpO2 96%     General: Well defined, nourished, and groomed individual in no acute distress.    Neck: Supple, nontender, no bruits, no pain with resistance to active range of motion.    Musculoskeletal: Extremities revealed no edema and had full range of motion of joints.    Psych: Good mood and bright affect    NEUROLOGICAL EXAMINATION:    Mental Status: Alert and oriented to person, place, and time    Cranial Nerves:    II, III, IV, VI: Visual acuity grossly intact. Visual fields are normal.    Pupils are equal, round, and reactive to light and accommodation.    Extra-ocular movements are full and fluid.   V-XII: Hearing is grossly intact. Facial features are symmetric, with normal sensation and strength. The palate rises symmetrically and the tongue protrudes midline.     Motor Examination: Normal tone, bulk, and strength, 5/5 muscle strength throughout.     Coordination: No resting or intention tremor    Gait and Station: Steady while walking. Normal arm swing. No pronator drift. No muscle wasting or fasiculations noted.       Orders Placed This Encounter    Fremanezumab-vfrm (AJOVY) 225 MG/1.5ML SOSY     Sig: Inject 225 mg into the skin every 30 days     Dispense:  1.5 mL     Refill:  11    ondansetron (ZOFRAN-ODT) 4 MG disintegrating tablet     Sig: Take one tablet at onset of migraine-related nausea. May repeat one tablet in 4 hours if nausea remains.  Caution: may cause drowsiness. Limit use to 2 days a week.     Dispense:  30 tablet     Refill:  5        1. Chronic migraine without aura, not intractable, without status migrainosus    We will send a new PA for Ajovy syringe and sent a new prescription to the pharmacy.  Patient will continue to use Ajovy 225 mg syringe monthly for migraine prevention as she is doing well with it.    We will discontinue Nurtec ODT as she feels it is not due working well for her

## 2024-07-16 NOTE — TELEPHONE ENCOUNTER
RE:Ajovy 225 MG syringe prior authorization request sent to [employer] via [facility]    Key: [id number]     PA Case ID: [id number]    Status is approved    Coverage Start Date:06/16/2024    Coverage End Date:07/16/2025    FYI to nurse

## 2025-07-15 ENCOUNTER — OFFICE VISIT (OUTPATIENT)
Age: 40
End: 2025-07-15
Payer: COMMERCIAL

## 2025-07-15 VITALS
HEART RATE: 80 BPM | SYSTOLIC BLOOD PRESSURE: 108 MMHG | DIASTOLIC BLOOD PRESSURE: 70 MMHG | RESPIRATION RATE: 20 BRPM | OXYGEN SATURATION: 97 %

## 2025-07-15 DIAGNOSIS — G43.709 CHRONIC MIGRAINE WITHOUT AURA, NOT INTRACTABLE, WITHOUT STATUS MIGRAINOSUS: ICD-10-CM

## 2025-07-15 DIAGNOSIS — G43.709 CHRONIC MIGRAINE WITHOUT AURA, NOT INTRACTABLE, WITHOUT STATUS MIGRAINOSUS: Primary | ICD-10-CM

## 2025-07-15 PROCEDURE — 99213 OFFICE O/P EST LOW 20 MIN: CPT

## 2025-07-15 RX ORDER — DICLOFENAC POTASSIUM 50 MG/1
POWDER, FOR SOLUTION ORAL
Qty: 1 EACH | Refills: 5 | Status: SHIPPED | OUTPATIENT
Start: 2025-07-15

## 2025-07-15 RX ORDER — FREMANEZUMAB-VFRM 225 MG/1.5ML
225 INJECTION SUBCUTANEOUS
Qty: 1.5 ML | Refills: 11 | Status: ACTIVE | OUTPATIENT
Start: 2025-07-15

## 2025-07-15 ASSESSMENT — ENCOUNTER SYMPTOMS: NAUSEA: 1

## 2025-07-15 NOTE — PROGRESS NOTES
Headaches- pretty well controlled   Doesn't feel like she needs to make any changes   Averages using the cambia maybe 1-2 a month

## 2025-07-15 NOTE — PROGRESS NOTES
Rayne Velasco is a 39 y.o. female who presents with the following  Chief Complaint   Patient presents with    Follow-up     Headaches        HPI  Patient is here today with her daughter for yearly migraine follow-up.  The patient is established on Ajovy syringe monthly for migraine prevention and has Cambia for rescue therapy.  She tells me that she is still well-controlled having about 1 or 2 migraine days a month that may last a couple of days each.  She typically reaches for over-the-counter medication first but if the headache does not go away then she will reach for the Cambia.  She does feel like the Cambia works well within 30 minutes or so to abort.  Overall, she is doing well and would like to keep everything the same for now.  Of note she does have gastroparesis.  Allergies   Allergen Reactions    Sulfa Antibiotics Hives       Current Outpatient Medications   Medication Sig Dispense Refill    ondansetron (ZOFRAN-ODT) 4 MG disintegrating tablet Take one tablet at onset of migraine-related nausea. May repeat one tablet in 4 hours if nausea remains.  Caution: may cause drowsiness. Limit use to 2 days a week. 30 tablet 5    Cetirizine HCl (ZYRTEC ALLERGY) 10 MG CAPS Zyrtec      vitamin D (CHOLECALCIFEROL) 25 MCG (1000 UT) TABS tablet Take 1 tablet by mouth daily      Fremanezumab-vfrm (AJOVY) 225 MG/1.5ML SOSY Inject 225 mg into the skin every 30 days 1.5 mL 11    Diclofenac Potassium,Migraine, 50 MG PACK Mix 1 packet with 1-2 ounces of water and drink immediately. One packet per migraine. Do not use more than 10 times per month. Do not use with other NSAIDS.  Indications: a migraine headache 1 each 5     No current facility-administered medications for this visit.        Social History     Tobacco Use   Smoking Status Never   Smokeless Tobacco Never       Past Medical History:   Diagnosis Date    Migraine        History reviewed. No pertinent surgical history.    History reviewed. No pertinent family

## 2025-07-16 ENCOUNTER — TELEPHONE (OUTPATIENT)
Age: 40
End: 2025-07-16

## 2025-07-16 NOTE — TELEPHONE ENCOUNTER
RE:Cambia 50 mg packet renewed via [employer]     (Key: [id number])    Case ID:[id number]    Status:Approved    Coverage Start Date:06/16/2025    Coverage End Date:07/16/2026    HADLEY to nurse